# Patient Record
Sex: OTHER/UNKNOWN | Race: WHITE | NOT HISPANIC OR LATINO | URBAN - METROPOLITAN AREA
[De-identification: names, ages, dates, MRNs, and addresses within clinical notes are randomized per-mention and may not be internally consistent; named-entity substitution may affect disease eponyms.]

---

## 2017-03-07 ENCOUNTER — APPOINTMENT (OUTPATIENT)
Dept: URBAN - METROPOLITAN AREA CLINIC 290 | Age: 52
Setting detail: DERMATOLOGY
End: 2017-03-08

## 2017-03-07 DIAGNOSIS — Z41.9 ENCOUNTER FOR PROCEDURE FOR PURPOSES OTHER THAN REMEDYING HEALTH STATE, UNSPECIFIED: ICD-10-CM

## 2017-03-07 DIAGNOSIS — L57.0 ACTINIC KERATOSIS: ICD-10-CM

## 2017-03-07 PROCEDURE — OTHER COUNSELING: OTHER

## 2017-03-07 PROCEDURE — OTHER LIQUID NITROGEN: OTHER

## 2017-03-07 PROCEDURE — OTHER BOTOX (U OR CC): OTHER

## 2017-03-07 PROCEDURE — 17000 DESTRUCT PREMALG LESION: CPT

## 2017-03-07 ASSESSMENT — LOCATION SIMPLE DESCRIPTION DERM: LOCATION SIMPLE: NOSE

## 2017-03-07 ASSESSMENT — LOCATION DETAILED DESCRIPTION DERM: LOCATION DETAILED: NASAL DORSUM

## 2017-03-07 ASSESSMENT — LOCATION ZONE DERM: LOCATION ZONE: NOSE

## 2017-03-07 NOTE — PROCEDURE: LIQUID NITROGEN
Duration Of Freeze Thaw-Cycle (Seconds): 0
Detail Level: Detailed
Consent: Warned of risk of scar, blister, scab and possible need for multiple treatments. IFD\\nVerbal consent obtained
Render Post-Care Instructions In Note?: no

## 2017-03-07 NOTE — PROCEDURE: BOTOX (U OR CC)
Lateral Platysmal Bands Units: 0
Document As Units Or Cc?: units
Consent: Verbal consent obtained. Risks include but not limited to lid/brow ptosis, bruising, swelling, diplopia, temporary effect, incomplete chemical denervation.
Additional Area 2 Location: Glabella
Additional Area 4 Location: Right lower lid
Additional Area 1 Location: Crows feet
Price (Use Numbers Only, No Special Characters Or $): 200
Detail Level: Zone
Dilution (U/0.1 Cc): 2.4
Additional Area 1 Units: 20
Additional Area 3 Location: Lip

## 2017-06-29 ENCOUNTER — APPOINTMENT (OUTPATIENT)
Dept: URBAN - METROPOLITAN AREA CLINIC 290 | Age: 52
Setting detail: DERMATOLOGY
End: 2017-07-05

## 2017-06-29 DIAGNOSIS — Z41.9 ENCOUNTER FOR PROCEDURE FOR PURPOSES OTHER THAN REMEDYING HEALTH STATE, UNSPECIFIED: ICD-10-CM

## 2017-06-29 PROCEDURE — OTHER BOTOX (U OR CC): OTHER

## 2017-06-29 NOTE — PROCEDURE: BOTOX (U OR CC)
Consent: Verbal consent obtained. Risks include but not limited to lid/brow ptosis, bruising, swelling, diplopia, temporary effect, incomplete chemical denervation.
Anterior Platysmal Bands Units: 0
Additional Area 4 Location: Right lower lid
Additional Area 1 Units: 20
Document As Units Or Cc?: units
Detail Level: Zone
Price (Use Numbers Only, No Special Characters Or $): 200
Additional Area 2 Location: Glabella
Additional Area 3 Location: Lip
Additional Area 1 Location: Crows feet
Dilution (U/0.1 Cc): 2.4

## 2017-11-13 ENCOUNTER — APPOINTMENT (OUTPATIENT)
Dept: URBAN - METROPOLITAN AREA CLINIC 290 | Age: 52
Setting detail: DERMATOLOGY
End: 2017-11-14

## 2017-11-13 DIAGNOSIS — L57.8 OTHER SKIN CHANGES DUE TO CHRONIC EXPOSURE TO NONIONIZING RADIATION: ICD-10-CM

## 2017-11-13 DIAGNOSIS — Z41.9 ENCOUNTER FOR PROCEDURE FOR PURPOSES OTHER THAN REMEDYING HEALTH STATE, UNSPECIFIED: ICD-10-CM

## 2017-11-13 PROCEDURE — OTHER BOTOX (U OR CC): OTHER

## 2017-11-13 PROCEDURE — OTHER PRESCRIPTION: OTHER

## 2017-11-13 PROCEDURE — 99212 OFFICE O/P EST SF 10 MIN: CPT

## 2017-11-13 PROCEDURE — OTHER OTHER (COSMETIC): OTHER

## 2017-11-13 RX ORDER — TRETINOIN 0.4 MG/G
GEL TOPICAL
Qty: 20 | Refills: 0 | COMMUNITY
Start: 2017-11-13

## 2017-11-13 NOTE — PROCEDURE: BOTOX (U OR CC)
Levator Labii Superioris Units: 0
Additional Area 1 Units: 20
Dilution (U/0.1 Cc): 2.4
Additional Area 4 Location: Right lower lid
Consent: Verbal consent obtained. Risks include but not limited to lid/brow ptosis, bruising, swelling, diplopia, temporary effect, incomplete chemical denervation.
Additional Area 2 Location: Glabella
Additional Area 3 Location: Lip
Document As Units Or Cc?: units
Price (Use Numbers Only, No Special Characters Or $): 200
Detail Level: Zone
Additional Area 1 Location: Crows feet